# Patient Record
Sex: MALE | Race: OTHER | Employment: UNEMPLOYED | URBAN - METROPOLITAN AREA
[De-identification: names, ages, dates, MRNs, and addresses within clinical notes are randomized per-mention and may not be internally consistent; named-entity substitution may affect disease eponyms.]

---

## 2018-05-02 ENCOUNTER — HOSPITAL ENCOUNTER (OUTPATIENT)
Dept: SLEEP MEDICINE | Age: 33
Discharge: HOME | End: 2018-05-02
Payer: MEDICAID

## 2018-05-02 PROCEDURE — 95806 SLEEP STUDY UNATT&RESP EFFT: CPT

## 2018-05-24 PROBLEM — E78.00 HYPERCHOLESTEROLEMIA: Status: ACTIVE | Noted: 2018-05-24

## 2018-05-24 PROBLEM — F32.A MILD DEPRESSION: Status: RESOLVED | Noted: 2018-05-24 | Resolved: 2018-05-24

## 2018-05-24 PROBLEM — F32.A MILD DEPRESSION: Status: ACTIVE | Noted: 2018-05-24

## 2018-05-24 PROBLEM — F81.9 LEARNING DISABILITY: Status: ACTIVE | Noted: 2018-05-24

## 2018-05-24 PROBLEM — F20.0 PARANOID SCHIZOPHRENIA (HCC): Status: ACTIVE | Noted: 2018-05-24

## 2018-08-08 ENCOUNTER — HOSPITAL ENCOUNTER (OUTPATIENT)
Dept: PHYSICAL THERAPY | Age: 33
Discharge: HOME | End: 2018-08-08
Payer: MEDICAID

## 2018-08-08 PROCEDURE — 97165 OT EVAL LOW COMPLEX 30 MIN: CPT

## 2018-08-08 PROCEDURE — 97110 THERAPEUTIC EXERCISES: CPT

## 2018-08-09 NOTE — PROGRESS NOTES
Ambulatory/Rehab Services H2 Model Falls Risk Assessment    Risk Factor Pts. ·   Confusion/Disorientation/Impulsivity  []    4 ·   Symptomatic Depression  []   2 ·   Altered Elimination  []   1 ·   Dizziness/Vertigo  []   1 ·   Gender (Male)  [x]   1 ·   Any administered antiepileptics (anticonvulsants):  []   2 ·   Any administered benzodiazepines:  []   1 ·   Visual Impairment (specify):  []   1 ·   Portable Oxygen Use  []   1 ·   Orthostatic ? BP  []   1 ·   History of Recent Falls (within 3 mos.)  []   5     Ability to Rise from Chair (choose one) Pts. ·   Ability to rise in a single movement  [x]   0 ·   Pushes up, successful in one attempt  []   1 ·   Multiple attempts, but successful  []   3 ·   Unable to rise without assistance  []   4   Total: (5 or greater = High Risk) 1     Falls Prevention Plan:   []                Physical Limitations to Exercise (specify):   []                Mobility Assistance Device (type):   []                Exercise/Equipment Adaptation (specify):    ©2010 [facility] [address] Patent #[id number].  Federal Law prohibits the replication, distribution or use without written permission from [facility] [employer]

## 2018-08-09 NOTE — THERAPY EVALUATION
Huma Lora  :   Primary: [place] [employer]  Secondary:  [address] at [place] [id number] [facility], [address] [place] [address],[address], [facility] [address].  [phone]   [phone]          OUTPATIENT OCCUPATIONAL THERAPY: Initial Assessment 2018    ICD-10: Treatment Diagnosis: Stiffness of left hand, not elsewhere classified (M25.642)Pain in left hand (K63.055)  Precautions/Allergies:   Review of patient's allergies indicates no known allergies. Fall Risk Score: 1 (? 5 = High Risk)  MD Orders: Evaluate and treat MEDICAL/REFERRING DIAGNOSIS:   Pain in left hand [M79.642]   DATE OF ONSET:    REFERRING PHYSICIAN: Lashodna Romero MD  RETURN PHYSICIAN APPOINTMENT: 6 weeks     INITIAL ASSESSMENT:  Mr. Vasquez Martinez presents with decreased functional use, strength and range of motion of his left hand and upper extremity that is affecting his independence with activities of daily living and ability to perform job tasks. I feel that Mr. Vasquez Martinez will benefit from skilled occupational therapy to maximize the functional use of his hand and upper extremity in daily activities . PLAN OF CARE:   PROBLEM LIST:  1. Pain in left hand. 2. Decreased motion in left hand. 3. Decreased strength in left hand. INTERVENTIONS PLANNED:  1. Modalities that may include fluidotherapy, paraffin, ultrasound, and light therapy. 2. Therapeutic exercise including a home exercise program.  3. Manual therapy. 4. Therapeutic activities. TREATMENT PLAN:  Effective Dates: 2018 TO 2018 (90 days). Frequency/Duration: 2 times a week for 90 Days  GOALS: (Goals have been discussed and agreed upon with patient.)  Short-Term Functional Goals: Time Frame: 4 weeks  1. Decrease pain to 5 to allow patient to perform self care tasks. 2. Increase motion in left hand by 15 degrees to improve functional use of upper extremity in ADL activities.   3. Increase strength in left hand by 10 pounds to allow patient to  and lift objects during self care activities. Discharge Goals: Time Frame: 12 weeks  1. Decrease pain to 2 to allow patient to perform all household  tasks. 2. Increase motion in left hand by 30 degreees to allow patient to perform all ADL activities. 3. Increase strength in left hand by 15 pounds to allow patient to , lift, hold, and carry heavy objects. Rehabilitation Potential For Stated Goals: Katie Mcgee's therapy, I certify that the treatment plan above will be carried out by a therapist or under their direction. Thank you for this referral,  Ángel Perez, OT       Referring Physician Signature: Minerva Rae MD _________________________  Date _________            The information in this section was collected on 8/8/2018 (except where otherwise noted). OCCUPATIONAL PROFILE & HISTORY:   History of Present Injury/Illness (Reason for Referral): The patient fractured his left little finger many years ago and continues to have pain and weakness. Past Medical History/Comorbidities:   Mr. Robert Wilkerson  has a past medical history of Depression. Mr. Robert Wilkerson  has no past surgical history on file. Social History/Living Environment:   Home Environment: Private residence  Prior Level of Function/Work/Activity:  The patient has been disabled for many years. Dominant Side:         RIGHT  Current Medications:    Current Outpatient Prescriptions:     levothyroxine (SYNTHROID) 150 mcg tablet, Take 1 Tab by mouth Daily (before breakfast). , Disp: 30 Tab, Rfl: 6    atorvastatin (LIPITOR) 10 mg tablet, Take 1 Tab by mouth daily. , Disp: 30 Tab, Rfl: 6    hydrOXYzine HCl (ATARAX) 25 mg tablet, Take 25 mg by mouth two (2) times daily as needed for Itching. Indications: anxiety, Disp: , Rfl:     sertraline (ZOLOFT) 25 mg tablet, Take 25 mg by mouth daily. , Disp: , Rfl:     traZODone (DESYREL) 50 mg tablet, Take 50 mg by mouth nightly., Disp: , Rfl: Date Last Reviewed:  8/8/2018   Number of medical conditions (excluding presenting problem) that affect the Plan of Care: Brief history (0):  LOW COMPLEXITY   ASSESSMENT OF OCCUPATIONAL PERFORMANCE:   RANGE OF MOTION:     · AROM: Left little motion is as follows: MP 0/55, PIP 0/95, DIP 0/66 degrees. Tip to [facility] 1 Cm. STRENGTH:   STRENGTH: Right 119 lbs. Left 65 lbs. LAT PINCH : Right 29 lbs. Left 23 lbs. SENSATION:  Intact           Physical Skills Involved:  1. Range of Motion  2. Strength  3. Pain (Chronic) Cognitive Skills Affected (resulting in the inability to perform in a timely and safe manner):  1. Comprehension Psychosocial Skills Affected:  1. Social Interaction   Number of elements that affect the Plan of Care: 5+:  HIGH COMPLEXITY   CLINICAL DECISION MAKING:   Outcome Measure: Tool Used: Disabilities of the Arm, Shoulder and Hand (DASH) Questionnaire - Quick Version  Score:  Initial: 20/55  Most Recent: X/55 (Date: -- )   Interpretation of Score: The DASH is designed to measure the activities of daily living in person's with upper extremity dysfunction or pain. Each section is scored on a 1-5 scale, 5 representing the greatest disability. The scores of each section are added together for a total score of 55. Score 11 12-19 20-28 29-37 38-45 46-54 55   Modifier CH CI CJ CK CL CM CN     ? Carrying, Moving, and Handling Objects:     - CURRENT STATUS: CJ - 20%-39% impaired, limited or restricted    - GOAL STATUS: CI - 1%-19% impaired, limited or restricted    - D/C STATUS:  ---------------To be determined---------------    Medical Necessity:   · Patient is expected to demonstrate progress in strength and range of motion to increase independence with ADL and household activities. .  Reason for Services/Other Comments:  · The patient has limited motion, strength and function in his left hand. .  Clinical Decision-Making Assessment:     Clinical Decision-Making: LOW COMPLEXITY   TREATMENT:   (In addition to Assessment/Re-Assessment sessions the following treatments were rendered)  Pre-treatment Symptoms/Complaints:  Pain and stiffness in left hand. Pain: Initial:   Pain Intensity 1: 4 (increasing to 7 when most intense ( gripping ))  Pain Location 1: Hand  Pain Orientation 1: Left  Post Session:  3     The patient was instructed in a home exercise and strengthening program. ( 30 minutes ) 8/8/2018      Treatment/Session Assessment:    · Response to Treatment:  Patients tolerated treatment well with no complications. Upon completion of treatment, skin condition was normal..  · Compliance with Program/Exercises: Will assess as treatment progresses. · Recommendations/Intent for next treatment session: \"Next visit will focus on advancements to more challenging activities\".   Total Treatment Duration:  OT Patient Time In/Time Out  Time In: 0430  Time Out: [address], OT

## 2018-08-13 ENCOUNTER — HOSPITAL ENCOUNTER (OUTPATIENT)
Dept: PHYSICAL THERAPY | Age: 33
Discharge: HOME | End: 2018-08-13
Payer: MEDICAID

## 2018-08-13 PROCEDURE — 97140 MANUAL THERAPY 1/> REGIONS: CPT

## 2018-08-13 PROCEDURE — 97018 PARAFFIN BATH THERAPY: CPT

## 2018-08-13 PROCEDURE — 97022 WHIRLPOOL THERAPY: CPT

## 2018-08-13 PROCEDURE — 97110 THERAPEUTIC EXERCISES: CPT

## 2018-08-15 NOTE — PROGRESS NOTES
Suhail Martinez  :   Primary: [place] Medicaid  Secondary:  [address] at [place] [address] [facility], [address] [place] [address],[address], [place] [address].  [phone]   [phone]          OUTPATIENT OCCUPATIONAL THERAPY: Daily Note 2018    ICD-10: Treatment Diagnosis: Stiffness of left hand, not elsewhere classified (M25.642)Pain in left hand (R74.493)  Precautions/Allergies:   Review of patient's allergies indicates no known allergies. Fall Risk Score: 1 (? 5 = High Risk)  MD Orders: Evaluate and treat MEDICAL/REFERRING DIAGNOSIS:   Pain in left hand [M79.642]   DATE OF ONSET:    REFERRING PHYSICIAN: Namrata Yee MD  RETURN PHYSICIAN APPOINTMENT: 6 weeks     INITIAL ASSESSMENT:  Mr. Anjelica Pagan presents with decreased functional use, strength and range of motion of his left hand and upper extremity that is affecting his independence with activities of daily living and ability to perform job tasks. I feel that Mr. Anjelica Pagan will benefit from skilled occupational therapy to maximize the functional use of his hand and upper extremity in daily activities . PLAN OF CARE:   PROBLEM LIST:  1. Pain in left hand. 2. Decreased motion in left hand. 3. Decreased strength in left hand. INTERVENTIONS PLANNED:  1. Modalities that may include fluidotherapy, paraffin, ultrasound, and light therapy. 2. Therapeutic exercise including a home exercise program.  3. Manual therapy. 4. Therapeutic activities. TREATMENT PLAN:  Effective Dates: 2018 TO 2018 (90 days). Frequency/Duration: 2 times a week for 90 Days  GOALS: (Goals have been discussed and agreed upon with patient.)  Short-Term Functional Goals: Time Frame: 4 weeks  1. Decrease pain to 5 to allow patient to perform self care tasks. 2. Increase motion in left hand by 15 degrees to improve functional use of upper extremity in ADL activities.   3. Increase strength in left hand by 10 pounds to allow patient to  and lift objects during self care activities. Discharge Goals: Time Frame: 12 weeks  1. Decrease pain to 2 to allow patient to perform all household  tasks. 2. Increase motion in left hand by 30 degreees to allow patient to perform all ADL activities. 3. Increase strength in left hand by 15 pounds to allow patient to , lift, hold, and carry heavy objects. Rehabilitation Potential For Stated Goals: Katie Mcgee's therapy, I certify that the treatment plan above will be carried out by a therapist or under their direction. Thank you for this referral,  Ron Velásquez, OT       Referring Physician Signature: Kenton Steiner MD _________________________  Date _________            The information in this section was collected on 8/8/2018 (except where otherwise noted). OCCUPATIONAL PROFILE & HISTORY:   History of Present Injury/Illness (Reason for Referral): The patient fractured his left little finger many years ago and continues to have pain and weakness. Past Medical History/Comorbidities:   Mr. Larissa Becerra  has a past medical history of Depression. Mr. Larissa Becerra  has no past surgical history on file. Social History/Living Environment:      Prior Level of Function/Work/Activity:  The patient has been disabled for many years. Dominant Side:         RIGHT  Current Medications:    Current Outpatient Prescriptions:     levothyroxine (SYNTHROID) 150 mcg tablet, Take 1 Tab by mouth Daily (before breakfast). , Disp: 30 Tab, Rfl: 6    atorvastatin (LIPITOR) 10 mg tablet, Take 1 Tab by mouth daily. , Disp: 30 Tab, Rfl: 6    hydrOXYzine HCl (ATARAX) 25 mg tablet, Take 25 mg by mouth two (2) times daily as needed for Itching. Indications: anxiety, Disp: , Rfl:     sertraline (ZOLOFT) 25 mg tablet, Take 25 mg by mouth daily. , Disp: , Rfl:     traZODone (DESYREL) 50 mg tablet, Take 50 mg by mouth nightly., Disp: , Rfl:    Date Last Reviewed:  8/8/2018   Number of medical conditions (excluding presenting problem) that affect the Plan of Care: Brief history (0):  LOW COMPLEXITY   ASSESSMENT OF OCCUPATIONAL PERFORMANCE:   RANGE OF MOTION:     · AROM: Left little motion is as follows: MP 0/55, PIP 0/95, DIP 0/66 degrees. Tip to [facility] 1 Cm. STRENGTH:   STRENGTH: Right 119 lbs. Left 65 lbs. LAT PINCH : Right 29 lbs. Left 23 lbs. SENSATION:  Intact           Physical Skills Involved:  1. Range of Motion  2. Strength  3. Pain (Chronic) Cognitive Skills Affected (resulting in the inability to perform in a timely and safe manner):  1. Comprehension Psychosocial Skills Affected:  1. Social Interaction   Number of elements that affect the Plan of Care: 5+:  HIGH COMPLEXITY   CLINICAL DECISION MAKING:   Outcome Measure: Tool Used: Disabilities of the Arm, Shoulder and Hand (DASH) Questionnaire - Quick Version  Score:  Initial: 20/55  Most Recent: X/55 (Date: -- )   Interpretation of Score: The DASH is designed to measure the activities of daily living in person's with upper extremity dysfunction or pain. Each section is scored on a 1-5 scale, 5 representing the greatest disability. The scores of each section are added together for a total score of 55. Score 11 12-19 20-28 29-37 38-45 46-54 55   Modifier CH CI CJ CK CL CM CN     ? Carrying, Moving, and Handling Objects:     - CURRENT STATUS: CJ - 20%-39% impaired, limited or restricted    - GOAL STATUS: CI - 1%-19% impaired, limited or restricted    - D/C STATUS:  ---------------To be determined---------------    Medical Necessity:   · Patient is expected to demonstrate progress in strength and range of motion to increase independence with ADL and household activities. .  Reason for Services/Other Comments:  · The patient has limited motion, strength and function in his left hand. .  Clinical Decision-Making Assessment:     Clinical Decision-Making: LOW COMPLEXITY   TREATMENT:   (In addition to Assessment/Re-Assessment sessions the following treatments were rendered)  Pre-treatment Symptoms/Complaints:  Pain and stiffness in left hand. Pain: Initial:   Pain Intensity 1: 5  Pain Location 1: Hand  Pain Orientation 1: Left  Post Session:  3     The patient was instructed in a home exercise and strengthening program. ( 30 minutes ) 8/8/2018  Patient stated \"I am doing my exercises\"    Manual Therapy: (Soft Tissue Mobilization Duration  Duration: 15 Minutes  Duration: 15 Minutes): Technique: Retrograde massage (followed by light tx & PROM)  Tissue Mobilized: Scar/adhesion  LUE Soft Tissue Mobilization: Yes   Therapeutic Exercise:                                                                               : The patient's home exercise program was reviewed. Date:  8/13/18 Date: Date: Date: Date:   Activity/Exercise Parameters Parameters Parameters Parameters Parameters   AROM during Fluidotherapy 20 min       Paraffin with Stretch   15 min  Finger flexion       Retrograde massage, Friction Scar massage, Joint Mobilization   15 min  Light tx & PROM       Scarf Curl   3 min       Washer Game        Individual Gripper 20 reps         Hand Bard   20 reps       Cones          Pegs          Clothes Pins 20 reps         A-R Bar          Exerstick 40 reps         Velcro-Roll                  RESISTIVE EXERCISES Weight/ Sets/Reps   Weight/ Sets/Reps Weight/ Sets/Reps Weight/ Sets/Reps Weight/ Sets/Reps   WEIGHT WELL        Sup/Pro        UD/RD        Wrist Flex/Ext        Free Weights          UBE(Minutes)          Nautilus        Compound Row        Vertical Chest        Overhead Press                    HEP: As above; handouts given to patient for all exercises.     Therapeutic Modalities:         Left Wrist Heat  Type: Paraffin bath (while perfoming AROM ex)  Duration: 15 minutes  Patient Position: Sitting                                     Joint Mobilization:        Treatment Times:  · Therapeutic Exercise: 30 minutes  · Manual Therapy: 15 minutes  · Parafin: 15 minutes  · Whirlpool:  minutes  · Other:  minutes       Treatment/Session Assessment:    · Response to Treatment:  Patients tolerated treatment well with no complications. Upon completion of treatment, skin condition was normal..  · Compliance with Program/Exercises: Will assess as treatment progresses. · Recommendations/Intent for next treatment session: \"Next visit will focus on advancements to more challenging activities\". Will continue per MD  Total Treatment Duration:  OT Patient Time In/Time Out  Time In: 0400  Time Out: 0500    Cameron Adler OT

## 2018-08-16 ENCOUNTER — HOSPITAL ENCOUNTER (OUTPATIENT)
Dept: PHYSICAL THERAPY | Age: 33
Discharge: HOME | End: 2018-08-16
Payer: MEDICAID

## 2018-08-16 PROCEDURE — 97140 MANUAL THERAPY 1/> REGIONS: CPT

## 2018-08-16 PROCEDURE — 97018 PARAFFIN BATH THERAPY: CPT

## 2018-08-16 PROCEDURE — 97110 THERAPEUTIC EXERCISES: CPT

## 2018-08-18 NOTE — PROGRESS NOTES
Salazar Munoz  :   Primary: [employer]i Chol Medicaid  Secondary:  [address] at [facility]  [address], [address] [address], [facility] [address].  [phone]   [phone]          OUTPATIENT OCCUPATIONAL THERAPY: Daily Note 2018    ICD-10: Treatment Diagnosis: Stiffness of left hand, not elsewhere classified (M25.642)Pain in left hand (L44.742)  Precautions/Allergies:   Review of patient's allergies indicates no known allergies. Fall Risk Score: 1 (? 5 = High Risk)  MD Orders: Evaluate and treat MEDICAL/REFERRING DIAGNOSIS:   Pain in left hand [M79.642]   DATE OF ONSET:    REFERRING PHYSICIAN: Miguel Ellington MD  RETURN PHYSICIAN APPOINTMENT: 6 weeks     INITIAL ASSESSMENT:  Mr. Sheryl Ardon presents with decreased functional use, strength and range of motion of his left hand and upper extremity that is affecting his independence with activities of daily living and ability to perform job tasks. I feel that Mr. Sheryl Ardon will benefit from skilled occupational therapy to maximize the functional use of his hand and upper extremity in daily activities . PLAN OF CARE:   PROBLEM LIST:  1. Pain in left hand. 2. Decreased motion in left hand. 3. Decreased strength in left hand. INTERVENTIONS PLANNED:  1. Modalities that may include fluidotherapy, paraffin, ultrasound, and light therapy. 2. Therapeutic exercise including a home exercise program.  3. Manual therapy. 4. Therapeutic activities. TREATMENT PLAN:  Effective Dates: 2018 TO 2018 (90 days). Frequency/Duration: 2 times a week for 90 Days  GOALS: (Goals have been discussed and agreed upon with patient.)  Short-Term Functional Goals: Time Frame: 4 weeks  1. Decrease pain to 5 to allow patient to perform self care tasks. 2. Increase motion in left hand by 15 degrees to improve functional use of upper extremity in ADL activities.   3. Increase strength in left hand by 10 pounds to allow patient to  and lift objects during self care activities. Discharge Goals: Time Frame: 12 weeks  1. Decrease pain to 2 to allow patient to perform all household  tasks. 2. Increase motion in left hand by 30 degreees to allow patient to perform all ADL activities. 3. Increase strength in left hand by 15 pounds to allow patient to , lift, hold, and carry heavy objects. Rehabilitation Potential For Stated Goals: Katie Mcgee's therapy, I certify that the treatment plan above will be carried out by a therapist or under their direction. Thank you for this referral,  Mickie Acuña OT       Referring Physician Signature: Mike Ruffin MD _________________________  Date _________            The information in this section was collected on 8/8/2018 (except where otherwise noted). OCCUPATIONAL PROFILE & HISTORY:   History of Present Injury/Illness (Reason for Referral): The patient fractured his left little finger many years ago and continues to have pain and weakness. Past Medical History/Comorbidities:   Mr. Antonia Wolff  has a past medical history of Depression. Mr. Antonia Wolff  has no past surgical history on file. Social History/Living Environment:      Prior Level of Function/Work/Activity:  The patient has been disabled for many years. Dominant Side:         RIGHT  Current Medications:    Current Outpatient Prescriptions:     levothyroxine (SYNTHROID) 150 mcg tablet, Take 1 Tab by mouth Daily (before breakfast). , Disp: 30 Tab, Rfl: 6    atorvastatin (LIPITOR) 10 mg tablet, Take 1 Tab by mouth daily. , Disp: 30 Tab, Rfl: 6    hydrOXYzine HCl (ATARAX) 25 mg tablet, Take 25 mg by mouth two (2) times daily as needed for Itching. Indications: anxiety, Disp: , Rfl:     sertraline (ZOLOFT) 25 mg tablet, Take 25 mg by mouth daily. , Disp: , Rfl:     traZODone (DESYREL) 50 mg tablet, Take 50 mg by mouth nightly., Disp: , Rfl:    Date Last Reviewed:  8/16/2018   Number of medical conditions (excluding presenting problem) that affect the Plan of Care: Brief history (0):  LOW COMPLEXITY   ASSESSMENT OF OCCUPATIONAL PERFORMANCE:   RANGE OF MOTION:     · AROM: Left little motion is as follows: MP 0/55, PIP 0/95, DIP 0/66 degrees. Tip to [facility] 1 Cm. STRENGTH:   STRENGTH: Right 119 lbs. Left 65 lbs. LAT PINCH : Right 29 lbs. Left 23 lbs. SENSATION:  Intact           Physical Skills Involved:  1. Range of Motion  2. Strength  3. Pain (Chronic) Cognitive Skills Affected (resulting in the inability to perform in a timely and safe manner):  1. Comprehension Psychosocial Skills Affected:  1. Social Interaction   Number of elements that affect the Plan of Care: 5+:  HIGH COMPLEXITY   CLINICAL DECISION MAKING:   Outcome Measure: Tool Used: Disabilities of the Arm, Shoulder and Hand (DASH) Questionnaire - Quick Version  Score:  Initial: 20/55  Most Recent: X/55 (Date: -- )   Interpretation of Score: The DASH is designed to measure the activities of daily living in person's with upper extremity dysfunction or pain. Each section is scored on a 1-5 scale, 5 representing the greatest disability. The scores of each section are added together for a total score of 55. Score 11 12-19 20-28 29-37 38-45 46-54 55   Modifier CH CI CJ CK CL CM CN     ? Carrying, Moving, and Handling Objects:     - CURRENT STATUS: CJ - 20%-39% impaired, limited or restricted    - GOAL STATUS: CI - 1%-19% impaired, limited or restricted    - D/C STATUS:  ---------------To be determined---------------    Medical Necessity:   · Patient is expected to demonstrate progress in strength and range of motion to increase independence with ADL and household activities. .  Reason for Services/Other Comments:  · The patient has limited motion, strength and function in his left hand. .  Clinical Decision-Making Assessment:     Clinical Decision-Making: LOW COMPLEXITY   TREATMENT:   (In addition to Assessment/Re-Assessment sessions the following treatments were rendered)  Pre-treatment Symptoms/Complaints:  Pain and stiffness in left hand. Pain: Initial:   Pain Intensity 1: 4  Pain Location 1: Hand  Pain Orientation 1: Left  Post Session:  3     The patient was instructed in a home exercise and strengthening program. ( 30 minutes ) 8/8/2018  Patient stated \"I am doing better\"    Manual Therapy: (Soft Tissue Mobilization Duration  Duration: 15 Minutes  Duration: 15 Minutes): Technique: Retrograde massage (followed by Light tx & PROM)  Tissue Mobilized: Scar/adhesion  LUE Soft Tissue Mobilization: Yes   Therapeutic Exercise:                                                                               : The patient's home exercise program was reviewed. Date:  8/13/18 Date:  8/16/18 Date: Date: Date:   Activity/Exercise Parameters Parameters Parameters Parameters Parameters   AROM during Fluidotherapy 20 min 20 min      Paraffin with Stretch   15 min  Finger flexion 15 min  Finger flexion      Retrograde massage, Friction Scar massage, Joint Mobilization   15 min  Light tx & PROM 15 min  Light tx  PROM      Scarf Curl   3 min 3 min      Washer Game        Individual Gripper 20 reps   25 reps      Hand [place]   20 reps 25 reps      Cones          Pegs          Clothes Pins 20 reps   20 reps      A-R Bar          Exerstick 40 reps   40 reps      Velcro-Roll                  RESISTIVE EXERCISES Weight/ Sets/Reps   Weight/ Sets/Reps Weight/ Sets/Reps Weight/ Sets/Reps Weight/ Sets/Reps   WEIGHT WELL        Sup/Pro        UD/RD        Wrist Flex/Ext        Free Weights          UBE(Minutes)          Nautilus        Compound Row        Vertical Chest        Overhead Press                    HEP: As above; handouts given to patient for all exercises.     Therapeutic Modalities:         Left Wrist Heat  Type: Paraffin bath (with a finger flexion stretch)  Duration: 15 minutes  Patient Position: Sitting                                     Joint Mobilization:        Treatment Times:  · Therapeutic Exercise: 30 minutes  · Manual Therapy: 15 minutes  · Parafin: 15 minutes  · Whirlpool:  minutes  · Other:  minutes       Treatment/Session Assessment:    · Response to Treatment:  Patients tolerated treatment well with no complications. Upon completion of treatment, skin condition was normal..  · Compliance with Program/Exercises: Will assess as treatment progresses. · Recommendations/Intent for next treatment session: \"Next visit will focus on advancements to more challenging activities\". Will continue per MD  Total Treatment Duration:  OT Patient Time In/Time Out  Time In: 0430  Time Out: [address] [facility],

## 2018-08-30 ENCOUNTER — HOSPITAL ENCOUNTER (OUTPATIENT)
Dept: PHYSICAL THERAPY | Age: 33
Discharge: HOME | End: 2018-08-30
Payer: MEDICAID

## 2018-08-30 PROCEDURE — 97140 MANUAL THERAPY 1/> REGIONS: CPT

## 2018-08-30 PROCEDURE — 97018 PARAFFIN BATH THERAPY: CPT

## 2018-08-30 PROCEDURE — 97110 THERAPEUTIC EXERCISES: CPT

## 2018-08-30 NOTE — PROGRESS NOTES
Atul Jackson  :   Primary: Brigid Mock Medicaid  Secondary:  [address] at [place] [address] [facility], [address] [place] [address],[address], [facility] [address].  [phone]   [phone]          OUTPATIENT OCCUPATIONAL THERAPY: Daily Note 2018    ICD-10: Treatment Diagnosis: Stiffness of left hand, not elsewhere classified (M25.642)Pain in left hand (P21.039)  Precautions/Allergies:   Review of patient's allergies indicates no known allergies. Fall Risk Score: 1 (? 5 = High Risk)  MD Orders: Evaluate and treat MEDICAL/REFERRING DIAGNOSIS:   Pain in left hand [M79.642]   DATE OF ONSET:    REFERRING PHYSICIAN: Fatemeh Brown MD  RETURN PHYSICIAN APPOINTMENT: 6 weeks     INITIAL ASSESSMENT:  Mr. Griffin Rosas presents with decreased functional use, strength and range of motion of his left hand and upper extremity that is affecting his independence with activities of daily living and ability to perform job tasks. I feel that Mr. Griffin Rosas will benefit from skilled occupational therapy to maximize the functional use of his hand and upper extremity in daily activities . PLAN OF CARE:   PROBLEM LIST:  1. Pain in left hand. 2. Decreased motion in left hand. 3. Decreased strength in left hand. INTERVENTIONS PLANNED:  1. Modalities that may include fluidotherapy, paraffin, ultrasound, and light therapy. 2. Therapeutic exercise including a home exercise program.  3. Manual therapy. 4. Therapeutic activities. TREATMENT PLAN:  Effective Dates: 2018 TO 2018 (90 days). Frequency/Duration: 2 times a week for 90 Days  GOALS: (Goals have been discussed and agreed upon with patient.)  Short-Term Functional Goals: Time Frame: 4 weeks  1. Decrease pain to 5 to allow patient to perform self care tasks. 2. Increase motion in left hand by 15 degrees to improve functional use of upper extremity in ADL activities.   3. Increase strength in left hand by 10 pounds to allow patient to  and lift objects during self care activities. Discharge Goals: Time Frame: 12 weeks  1. Decrease pain to 2 to allow patient to perform all household  tasks. 2. Increase motion in left hand by 30 degreees to allow patient to perform all ADL activities. 3. Increase strength in left hand by 15 pounds to allow patient to , lift, hold, and carry heavy objects. Rehabilitation Potential For Stated Goals: Katie Mcgee's therapy, I certify that the treatment plan above will be carried out by a therapist or under their direction. Thank you for this referral,  Lizabeth Coreas, AMANDO       Referring Physician Signature: Miguel Ellington MD _________________________  Date _________            The information in this section was collected on 8/8/2018 (except where otherwise noted). OCCUPATIONAL PROFILE & HISTORY:   History of Present Injury/Illness (Reason for Referral): The patient fractured his left little finger many years ago and continues to have pain and weakness. Past Medical History/Comorbidities:   Mr. Sheryl Ardon  has a past medical history of Depression. Mr. Sheryl Ardon  has no past surgical history on file. Social History/Living Environment:      Prior Level of Function/Work/Activity:  The patient has been disabled for many years. Dominant Side:         RIGHT  Current Medications:    Current Outpatient Prescriptions:     levothyroxine (SYNTHROID) 150 mcg tablet, Take 1 Tab by mouth Daily (before breakfast). , Disp: 30 Tab, Rfl: 6    atorvastatin (LIPITOR) 10 mg tablet, Take 1 Tab by mouth daily. , Disp: 30 Tab, Rfl: 6    hydrOXYzine HCl (ATARAX) 25 mg tablet, Take 25 mg by mouth two (2) times daily as needed for Itching. Indications: anxiety, Disp: , Rfl:     sertraline (ZOLOFT) 25 mg tablet, Take 25 mg by mouth daily. , Disp: , Rfl:     traZODone (DESYREL) 50 mg tablet, Take 50 mg by mouth nightly., Disp: , Rfl:    Date Last Reviewed:  8/16/2018   Number of medical conditions (excluding presenting problem) that affect the Plan of Care: Brief history (0):  LOW COMPLEXITY   ASSESSMENT OF OCCUPATIONAL PERFORMANCE:   RANGE OF MOTION:     · AROM: Left little motion is as follows: MP 0/55, PIP 0/95, DIP 0/66 degrees. Tip to [facility] 1 Cm. STRENGTH:   STRENGTH: Right 119 lbs. Left 65 lbs. LAT PINCH : Right 29 lbs. Left 23 lbs. SENSATION:  Intact           Physical Skills Involved:  1. Range of Motion  2. Strength  3. Pain (Chronic) Cognitive Skills Affected (resulting in the inability to perform in a timely and safe manner):  1. Comprehension Psychosocial Skills Affected:  1. Social Interaction   Number of elements that affect the Plan of Care: 5+:  HIGH COMPLEXITY   CLINICAL DECISION MAKING:   Outcome Measure: Tool Used: Disabilities of the Arm, Shoulder and Hand (DASH) Questionnaire - Quick Version  Score:  Initial: 20/55  Most Recent: X/55 (Date: -- )   Interpretation of Score: The DASH is designed to measure the activities of daily living in person's with upper extremity dysfunction or pain. Each section is scored on a 1-5 scale, 5 representing the greatest disability. The scores of each section are added together for a total score of 55. Score 11 12-19 20-28 29-37 38-45 46-54 55   Modifier CH CI CJ CK CL CM CN     ? Carrying, Moving, and Handling Objects:     - CURRENT STATUS: CJ - 20%-39% impaired, limited or restricted    - GOAL STATUS: CI - 1%-19% impaired, limited or restricted    - D/C STATUS:  ---------------To be determined---------------    Medical Necessity:   · Patient is expected to demonstrate progress in strength and range of motion to increase independence with ADL and household activities. .  Reason for Services/Other Comments:  · The patient has limited motion, strength and function in his left hand. .  Clinical Decision-Making Assessment:     Clinical Decision-Making: LOW COMPLEXITY   TREATMENT:   (In addition to Assessment/Re-Assessment sessions the following treatments were rendered)  Pre-treatment Symptoms/Complaints:  Pain and stiffness in left hand. Pain: Initial:   Pain Intensity 1: 0  Pain Location 1: Hand  Pain Orientation 1: Left  Post Session:  0     The patient was instructed in a home exercise and strengthening program. ( 30 minutes ) 8/8/2018  Patient stated \"I am not having any pain right now\"    Manual Therapy: (Soft Tissue Mobilization Duration  Duration: 15 Minutes  Duration: 15 Minutes): Technique: Retrograde massage (followed by Light tx & PROM)  Tissue Mobilized: Scar/adhesion  LUE Soft Tissue Mobilization: Yes   Therapeutic Exercise:                                                                               : The patient's home exercise program was reviewed. Date:  8/13/18 Date:  8/16/18 Date:  8/30/18 Date: Date:   Activity/Exercise Parameters Parameters Parameters Parameters Parameters   AROM during Fluidotherapy 20 min 20 min 20 min     Paraffin with Stretch   15 min  Finger flexion 15 min  Finger flexion 15 min  flexion     Retrograde massage, Friction Scar massage, Joint Mobilization   15 min  Light tx & PROM 15 min  Light tx  PROM 15 min  Light tx     Scarf Curl   3 min 3 min 3 min     Washer Game        Individual Gripper 20 reps   25 reps 25 reps     Hand Surveyor   20 reps 25 reps 25 reps     Cones          Pegs          Clothes Pins 20 reps   20 reps 20 reps     A-R Bar          Exerstick 40 reps   40 reps 40 reps     Velcro-Roll                  RESISTIVE EXERCISES Weight/ Sets/Reps   Weight/ Sets/Reps Weight/ Sets/Reps Weight/ Sets/Reps Weight/ Sets/Reps   WEIGHT WELL        Sup/Pro        UD/RD        Wrist Flex/Ext        Free Weights          UBE(Minutes)          Nautilus        Compound Row        Vertical Chest        Overhead Press                    HEP: As above; handouts given to patient for all exercises.     Therapeutic Modalities:         Left Wrist Heat  Type: Paraffin bath (with a finger flexion stretch)  Duration: 15 minutes  Patient Position: Sitting                                     Joint Mobilization:        Treatment Times:  · Therapeutic Exercise: 30 minutes  · Manual Therapy: 15 minutes  · Parafin: 15 minutes  · Whirlpool:  minutes  · Other:  minutes       Treatment/Session Assessment:    · Response to Treatment:  Patients tolerated treatment well with no complications. Upon completion of treatment, skin condition was normal..  · Compliance with Program/Exercises: Will assess as treatment progresses. · Recommendations/Intent for next treatment session: \"Next visit will focus on advancements to more challenging activities\". Will continue per MD  Total Treatment Duration:  OT Patient Time In/Time Out  Time In: 4277  Time Out: [address], OT

## 2018-09-06 ENCOUNTER — HOSPITAL ENCOUNTER (OUTPATIENT)
Dept: PHYSICAL THERAPY | Age: 33
Discharge: HOME | End: 2018-09-06
Payer: MEDICAID

## 2018-09-06 PROCEDURE — 97110 THERAPEUTIC EXERCISES: CPT

## 2018-09-06 PROCEDURE — 97140 MANUAL THERAPY 1/> REGIONS: CPT

## 2018-09-06 PROCEDURE — 97018 PARAFFIN BATH THERAPY: CPT

## 2018-09-10 NOTE — PROGRESS NOTES
Tequila Gonzalez  :   Primary: [employer] Medicaid  Secondary:  [address] at [place] [address] [facility], [address] [place] [address],[address], [address] [facility]  [phone]   [phone]          OUTPATIENT OCCUPATIONAL THERAPY: Daily Note 2018    ICD-10: Treatment Diagnosis: Stiffness of left hand, not elsewhere classified (M25.642)Pain in left hand (R36.411)  Precautions/Allergies:   Review of patient's allergies indicates no known allergies. Fall Risk Score: 1 (? 5 = High Risk)  MD Orders: Evaluate and treat MEDICAL/REFERRING DIAGNOSIS:   Pain in left hand [M79.642]   DATE OF ONSET:    REFERRING PHYSICIAN: Mariam Nicole MD  RETURN PHYSICIAN APPOINTMENT: 6 weeks     INITIAL ASSESSMENT:  Mr. Lucendia Frankel presents with decreased functional use, strength and range of motion of his left hand and upper extremity that is affecting his independence with activities of daily living and ability to perform job tasks. I feel that Mr. Lucendia Frankel will benefit from skilled occupational therapy to maximize the functional use of his hand and upper extremity in daily activities . PLAN OF CARE:   PROBLEM LIST:  1. Pain in left hand. 2. Decreased motion in left hand. 3. Decreased strength in left hand. INTERVENTIONS PLANNED:  1. Modalities that may include fluidotherapy, paraffin, ultrasound, and light therapy. 2. Therapeutic exercise including a home exercise program.  3. Manual therapy. 4. Therapeutic activities. TREATMENT PLAN:  Effective Dates: 2018 TO 2018 (90 days). Frequency/Duration: 2 times a week for 90 Days  GOALS: (Goals have been discussed and agreed upon with patient.)  Short-Term Functional Goals: Time Frame: 4 weeks  1. Decrease pain to 5 to allow patient to perform self care tasks. 2. Increase motion in left hand by 15 degrees to improve functional use of upper extremity in ADL activities.   3. Increase strength in left hand by 10 pounds to allow patient to  and lift objects during self care activities. Discharge Goals: Time Frame: 12 weeks  1. Decrease pain to 2 to allow patient to perform all household  tasks. 2. Increase motion in left hand by 30 degreees to allow patient to perform all ADL activities. 3. Increase strength in left hand by 15 pounds to allow patient to , lift, hold, and carry heavy objects. Rehabilitation Potential For Stated Goals: Katie Mcgee's therapy, I certify that the treatment plan above will be carried out by a therapist or under their direction. Thank you for this referral,  Meghna Villavicencio, OT       Referring Physician Signature: Sruthi Ross MD _________________________  Date _________            The information in this section was collected on 8/8/2018 (except where otherwise noted). OCCUPATIONAL PROFILE & HISTORY:   History of Present Injury/Illness (Reason for Referral): The patient fractured his left little finger many years ago and continues to have pain and weakness. Past Medical History/Comorbidities:   Mr. John Hernandez  has a past medical history of Depression. Mr. John Hernandez  has no past surgical history on file. Social History/Living Environment:      Prior Level of Function/Work/Activity:  The patient has been disabled for many years. Dominant Side:         RIGHT  Current Medications:    Current Outpatient Prescriptions:     levothyroxine (SYNTHROID) 150 mcg tablet, Take 1 Tab by mouth Daily (before breakfast). , Disp: 30 Tab, Rfl: 6    atorvastatin (LIPITOR) 10 mg tablet, Take 1 Tab by mouth daily. , Disp: 30 Tab, Rfl: 6    hydrOXYzine HCl (ATARAX) 25 mg tablet, Take 25 mg by mouth two (2) times daily as needed for Itching. Indications: anxiety, Disp: , Rfl:     sertraline (ZOLOFT) 25 mg tablet, Take 25 mg by mouth daily. , Disp: , Rfl:     traZODone (DESYREL) 50 mg tablet, Take 50 mg by mouth nightly., Disp: , Rfl:    Date Last Reviewed:  9/6/2018   Number of medical conditions (excluding presenting problem) that affect the Plan of Care: Brief history (0):  LOW COMPLEXITY   ASSESSMENT OF OCCUPATIONAL PERFORMANCE:   RANGE OF MOTION:     · AROM: Left little motion is as follows: MP 0/55, PIP 0/95, DIP 0/66 degrees. Tip to [facility] 1 Cm. STRENGTH:   STRENGTH: Right 119 lbs. Left 65 lbs. LAT PINCH : Right 29 lbs. Left 23 lbs. SENSATION:  Intact           Physical Skills Involved:  1. Range of Motion  2. Strength  3. Pain (Chronic) Cognitive Skills Affected (resulting in the inability to perform in a timely and safe manner):  1. Comprehension Psychosocial Skills Affected:  1. Social Interaction   Number of elements that affect the Plan of Care: 5+:  HIGH COMPLEXITY   CLINICAL DECISION MAKING:   Outcome Measure: Tool Used: Disabilities of the Arm, Shoulder and Hand (DASH) Questionnaire - Quick Version  Score:  Initial: 20/55  Most Recent: X/55 (Date: -- )   Interpretation of Score: The DASH is designed to measure the activities of daily living in person's with upper extremity dysfunction or pain. Each section is scored on a 1-5 scale, 5 representing the greatest disability. The scores of each section are added together for a total score of 55. Score 11 12-19 20-28 29-37 38-45 46-54 55   Modifier CH CI CJ CK CL CM CN     ? Carrying, Moving, and Handling Objects:     - CURRENT STATUS: CJ - 20%-39% impaired, limited or restricted    - GOAL STATUS: CI - 1%-19% impaired, limited or restricted    - D/C STATUS:  ---------------To be determined---------------    Medical Necessity:   · Patient is expected to demonstrate progress in strength and range of motion to increase independence with ADL and household activities. .  Reason for Services/Other Comments:  · The patient has limited motion, strength and function in his left hand. .  Clinical Decision-Making Assessment:     Clinical Decision-Making: LOW COMPLEXITY   TREATMENT:   (In addition to Assessment/Re-Assessment sessions the following treatments were rendered)  Pre-treatment Symptoms/Complaints:  Pain and stiffness in left hand. Pain: Initial:   Pain Intensity 1: 0  Pain Location 1: Hand  Pain Orientation 1: Left  Post Session:  0     The patient was instructed in a home exercise and strengthening program. ( 30 minutes ) 8/8/2018  Patient stated \"I am not having any pain right now\"    Manual Therapy: (Soft Tissue Mobilization Duration  Duration: 15 Minutes  Duration: 15 Minutes): Technique: Retrograde massage (followed by Light tx & PROM)  Tissue Mobilized: Scar/adhesion  LUE Soft Tissue Mobilization: Yes   Therapeutic Exercise:                                                                               : The patient's home exercise program was reviewed.                                                 Date:  8/13/18 Date:  8/16/18 Date:  8/30/18 Date:  9/6/18 Date:   Activity/Exercise Parameters Parameters Parameters Parameters Parameters   AROM during Fluidotherapy 20 min 20 min 20 min 20 min    Paraffin with Stretch   15 min  Finger flexion 15 min  Finger flexion 15 min  flexion 15 min    Retrograde massage, Friction Scar massage, Joint Mobilization   15 min  Light tx & PROM 15 min  Light tx  PROM 15 min  Light tx 15 min  Light tx    Scarf Curl   3 min 3 min 3 min 3 min    [place]her Game        Individual Gripper 20 reps   25 reps 25 reps 25 reps    Hand [place]   20 reps 25 reps 25 reps 25 reps    [place]es          Pegs          Clothes Pins 20 reps   20 reps 20 reps 25 reps    A-R Bar          Exerstick 40 reps   40 reps 40 reps 40 reps    Velcro-Roll                  RESISTIVE EXERCISES Weight/ Sets/Reps   Weight/ Sets/Reps Weight/ Sets/Reps Weight/ Sets/Reps Weight/ Sets/Reps   WEIGHT WELL        Sup/Pro        UD/RD        Wrist Flex/Ext        Free Weights          UBE(Minutes)          Nautilus        Compound Row        Vertical Chest        Overhead Press                    HEP: As above; handouts given to patient for all exercises. Therapeutic Modalities:         Left Wrist Heat  Type: Paraffin bath (with a finger flexion stretch)  Duration: 15 minutes  Patient Position: Sitting                                     Joint Mobilization:        Treatment Times:  · Therapeutic Exercise: 30 minutes  · Manual Therapy: 15 minutes  · Parafin: 15 minutes  · Whirlpool:  minutes  · Other:  minutes       Treatment/Session Assessment:    · Response to Treatment:  Patients tolerated treatment well with no complications. Upon completion of treatment, skin condition was normal..  · Compliance with Program/Exercises: Will assess as treatment progresses. · Recommendations/Intent for next treatment session: \"Next visit will focus on advancements to more challenging activities\". Will continue per MD  Total Treatment Duration:  OT Patient Time In/Time Out  Time In: 0500  Time Out: 1560 [facility],

## 2019-03-13 NOTE — THERAPY DISCHARGE
Lisbet Davidson  :   Primary: Denise Terrazash Medicaid  Secondary:  [facility] [place] [id number] [facility], [address] [place] [address],[address], [address] [facility]  [phone]   [phone]          OUTPATIENT OCCUPATIONAL THERAPY: Discontinuation Summary     ICD-10: Treatment Diagnosis: Stiffness of left hand, not elsewhere classified (M25.642)Pain in left hand (S38.420)  Precautions/Allergies:   Patient has no known allergies. Fall Risk Score: 1 (? 5 = High Risk)  MD Orders: Evaluate and treat MEDICAL/REFERRING DIAGNOSIS:   Pain in left hand [M79.642]   DATE OF ONSET:    REFERRING PHYSICIAN: Placido Rhoades MD  RETURN PHYSICIAN APPOINTMENT: 6 weeks     INITIAL ASSESSMENT:  Mr. Eufemia Garner presents with decreased functional use, strength and range of motion of his left hand and upper extremity that is affecting his independence with activities of daily living and ability to perform job tasks. I feel that Mr. Eufemia Garner will benefit from skilled occupational therapy to maximize the functional use of his hand and upper extremity in daily activities . PLAN OF CARE:   PROBLEM LIST:  1. Pain in left hand. 2. Decreased motion in left hand. 3. Decreased strength in left hand. INTERVENTIONS PLANNED:  1. Modalities that may include fluidotherapy, paraffin, ultrasound, and light therapy. 2. Therapeutic exercise including a home exercise program.  3. Manual therapy. 4. Therapeutic activities. TREATMENT PLAN:  Effective Dates: 2018 TO 2018 (90 days). Frequency/Duration: 2 times a week for 90 Days  GOALS: (Goals have been discussed and agreed upon with patient.)  Short-Term Functional Goals: Time Frame: 4 weeks  1. Decrease pain to 5 to allow patient to perform self care tasks. 2. Increase motion in left hand by 15 degrees to improve functional use of upper extremity in ADL activities.   3. Increase strength in left hand by 10 pounds to allow patient to  and lift objects during self care activities. Discharge Goals: Time Frame: 12 weeks  1. Decrease pain to 2 to allow patient to perform all household  tasks. 2. Increase motion in left hand by 30 degreees to allow patient to perform all ADL activities. 3. Increase strength in left hand by 15 pounds to allow patient to , lift, hold, and carry heavy objects. Rehabilitation Potential For Stated Goals: Katie Mcgee's therapy, I certify that the treatment plan above will be carried out by a therapist or under their direction. Thank you for this referral,  Cameron Adler, AMANDO       Referring Physician Signature: Namrata Yee MD _________________________  Date _________            The information in this section was collected on 8/8/2018 (except where otherwise noted). OCCUPATIONAL PROFILE & HISTORY:   History of Present Injury/Illness (Reason for Referral): The patient fractured his left little finger many years ago and continues to have pain and weakness. Past Medical History/Comorbidities:   Mr. Anjelica Pagan  has a past medical history of ALT (SGPT) level raised, Depression, and Thyroid disease. Mr. Anjelica Pagan  has no past surgical history on file. Social History/Living Environment:      Prior Level of Function/Work/Activity:  The patient has been disabled for many years. Dominant Side:         RIGHT  Current Medications:    Current Outpatient Medications:     levothyroxine (SYNTHROID) 150 mcg tablet, Take 1 Tab by mouth Daily (before breakfast). , Disp: 90 Tab, Rfl: 3    atorvastatin (LIPITOR) 10 mg tablet, Take 1 Tab by mouth daily. , Disp: 90 Tab, Rfl: 3    hydrOXYzine HCl (ATARAX) 25 mg tablet, Take 25 mg by mouth two (2) times daily as needed for Itching. Indications: anxiety, Disp: , Rfl:     sertraline (ZOLOFT) 25 mg tablet, Take 25 mg by mouth daily. , Disp: , Rfl:     traZODone (DESYREL) 50 mg tablet, Take 50 mg by mouth nightly., Disp: , Rfl:    Date Last Reviewed: 9/6/2018   Number of medical conditions (excluding presenting problem) that affect the Plan of Care: Brief history (0):  LOW COMPLEXITY   ASSESSMENT OF OCCUPATIONAL PERFORMANCE:   RANGE OF MOTION:     · AROM: Left little motion is as follows: MP 0/55, PIP 0/95, DIP 0/66 degrees. Tip to [facility] 1 Cm. STRENGTH:   STRENGTH: Right 119 lbs. Left 65 lbs. LAT PINCH : Right 29 lbs. Left 23 lbs. SENSATION:  Intact           Physical Skills Involved:  1. Range of Motion  2. Strength  3. Pain (Chronic) Cognitive Skills Affected (resulting in the inability to perform in a timely and safe manner):  1. Comprehension Psychosocial Skills Affected:  1. Social Interaction   Number of elements that affect the Plan of Care: 5+:  HIGH COMPLEXITY   CLINICAL DECISION MAKING:   Outcome Measure: Tool Used: Disabilities of the Arm, Shoulder and Hand (DASH) Questionnaire - Quick Version  Score:  Initial: 20/55  Most Recent: X/55 (Date: -- )   Interpretation of Score: The DASH is designed to measure the activities of daily living in person's with upper extremity dysfunction or pain. Each section is scored on a 1-5 scale, 5 representing the greatest disability. The scores of each section are added together for a total score of 55. Score 11 12-19 20-28 29-37 38-45 46-54 55   Modifier CH CI CJ CK CL CM CN     ? Carrying, Moving, and Handling Objects:     - CURRENT STATUS: CJ - 20%-39% impaired, limited or restricted    - GOAL STATUS: CI - 1%-19% impaired, limited or restricted    - D/C STATUS:  ---------------To be determined---------------    Medical Necessity:   · Patient is expected to demonstrate progress in strength and range of motion to increase independence with ADL and household activities. .  Reason for Services/Other Comments:  · The patient has limited motion, strength and function in his left hand. .  Clinical Decision-Making Assessment:     Clinical Decision-Making: LOW COMPLEXITY TREATMENT:   (In addition to Assessment/Re-Assessment sessions the following treatments were rendered)  Pre-treatment Symptoms/Complaints:  Pain and stiffness in left hand. Pain: Initial:   Pain Intensity 1: 0  Pain Location 1: Hand  Pain Orientation 1: Left  Post Session:  0       Treatment/Session Assessment:    · Response to Treatment:  Patients tolerated treatment well with no complications. Upon completion of treatment, skin condition was normal..  · Compliance with Program/Exercises: Will assess as treatment progresses. · Recommendations/Intent for next treatment session: \"To discharge, patient did not return to therapy. \"        Elyssa Ricardo, OT

## 2022-03-18 PROBLEM — F20.0 PARANOID SCHIZOPHRENIA (HCC): Status: ACTIVE | Noted: 2018-05-24

## 2022-03-19 PROBLEM — E78.00 HYPERCHOLESTEROLEMIA: Status: ACTIVE | Noted: 2018-05-24

## 2022-03-19 PROBLEM — F81.9 LEARNING DISABILITY: Status: ACTIVE | Noted: 2018-05-24

## 2023-09-28 ENCOUNTER — TRANSCRIBE ORDERS (OUTPATIENT)
Dept: GENERAL RADIOLOGY | Age: 38
End: 2023-09-28

## 2023-09-28 ENCOUNTER — HOSPITAL ENCOUNTER (OUTPATIENT)
Dept: NON INVASIVE DIAGNOSTICS | Age: 38
Discharge: HOME | End: 2023-09-28
Payer: COMMERCIAL

## 2023-09-28 ENCOUNTER — HOSPITAL ENCOUNTER (OUTPATIENT)
Dept: GENERAL RADIOLOGY | Age: 38
Discharge: HOME | End: 2023-09-30
Payer: COMMERCIAL

## 2023-09-28 DIAGNOSIS — M54.6 THORACIC BACK PAIN, UNSPECIFIED BACK PAIN LATERALITY, UNSPECIFIED CHRONICITY: Primary | ICD-10-CM

## 2023-09-28 DIAGNOSIS — M54.50 LOW BACK PAIN, UNSPECIFIED BACK PAIN LATERALITY, UNSPECIFIED CHRONICITY, UNSPECIFIED WHETHER SCIATICA PRESENT: ICD-10-CM

## 2023-09-28 DIAGNOSIS — M54.6 THORACIC BACK PAIN, UNSPECIFIED BACK PAIN LATERALITY, UNSPECIFIED CHRONICITY: ICD-10-CM

## 2023-09-28 PROCEDURE — 72072 X-RAY EXAM THORAC SPINE 3VWS: CPT

## 2023-09-28 PROCEDURE — 72110 X-RAY EXAM L-2 SPINE 4/>VWS: CPT

## 2023-09-28 PROCEDURE — 93005 ELECTROCARDIOGRAM TRACING: CPT | Performed by: NURSE PRACTITIONER

## 2023-09-29 LAB
EKG ATRIAL RATE: 45 BPM
EKG P AXIS: 21 DEGREES
EKG P-R INTERVAL: 150 MS
EKG Q-T INTERVAL: 424 MS
EKG QRS DURATION: 90 MS
EKG QTC CALCULATION (BAZETT): 366 MS
EKG R AXIS: 29 DEGREES
EKG T AXIS: 49 DEGREES
EKG VENTRICULAR RATE: 45 BPM

## 2024-09-10 ENCOUNTER — APPOINTMENT (OUTPATIENT)
Dept: GENERAL RADIOLOGY | Age: 39
End: 2024-09-10
Payer: COMMERCIAL

## 2024-09-10 ENCOUNTER — HOSPITAL ENCOUNTER (EMERGENCY)
Age: 39
Discharge: HOME | End: 2024-09-10
Payer: COMMERCIAL

## 2024-09-10 VITALS
OXYGEN SATURATION: 98 % | TEMPERATURE: 98.7 F | RESPIRATION RATE: 17 BRPM | DIASTOLIC BLOOD PRESSURE: 67 MMHG | SYSTOLIC BLOOD PRESSURE: 142 MMHG | WEIGHT: 173 LBS | HEART RATE: 60 BPM | BODY MASS INDEX: 24.22 KG/M2 | HEIGHT: 71 IN
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
## 2024-09-10 PROCEDURE — 6370000000 HC RX 637 (ALT 250 FOR IP)

## 2024-09-10 PROCEDURE — 90715 TDAP VACCINE 7 YRS/> IM: CPT

## 2024-09-10 PROCEDURE — 99284 EMERGENCY DEPT VISIT MOD MDM: CPT

## 2024-09-10 PROCEDURE — 71100 X-RAY EXAM RIBS UNI 2 VIEWS: CPT

## 2024-09-10 PROCEDURE — 6360000002 HC RX W HCPCS

## 2024-09-10 PROCEDURE — 90471 IMMUNIZATION ADMIN: CPT

## 2024-09-10 RX ORDER — BACITRACIN ZINC 500 [USP'U]/G
OINTMENT TOPICAL ONCE
Status: COMPLETED | OUTPATIENT
Start: 2024-09-10 | End: 2024-09-10

## 2024-09-10 RX ADMIN — TETANUS TOXOID, REDUCED DIPHTHERIA TOXOID AND ACELLULAR PERTUSSIS VACCINE, ADSORBED 0.5 ML: 5; 2.5; 8; 8; 2.5 SUSPENSION INTRAMUSCULAR at 16:51

## 2024-09-10 RX ADMIN — BACITRACIN ZINC: 500 OINTMENT TOPICAL at 16:43

## 2024-09-10 ASSESSMENT — PAIN - FUNCTIONAL ASSESSMENT
PAIN_FUNCTIONAL_ASSESSMENT: NONE - DENIES PAIN
PAIN_FUNCTIONAL_ASSESSMENT: 0-10

## 2024-09-10 ASSESSMENT — PAIN DESCRIPTION - LOCATION: LOCATION: ARM;RIB CAGE

## 2024-09-10 ASSESSMENT — LIFESTYLE VARIABLES
HOW MANY STANDARD DRINKS CONTAINING ALCOHOL DO YOU HAVE ON A TYPICAL DAY: 3 OR 4
HOW OFTEN DO YOU HAVE A DRINK CONTAINING ALCOHOL: 2-3 TIMES A WEEK

## 2024-09-10 ASSESSMENT — PAIN DESCRIPTION - DESCRIPTORS: DESCRIPTORS: ACHING

## 2024-09-10 ASSESSMENT — PAIN DESCRIPTION - FREQUENCY: FREQUENCY: CONTINUOUS

## 2024-09-10 ASSESSMENT — PAIN DESCRIPTION - PAIN TYPE: TYPE: ACUTE PAIN

## 2024-09-10 ASSESSMENT — PAIN DESCRIPTION - ORIENTATION: ORIENTATION: LEFT

## 2024-09-10 ASSESSMENT — PAIN SCALES - GENERAL: PAINLEVEL_OUTOF10: 5

## 2024-09-11 ASSESSMENT — ENCOUNTER SYMPTOMS
SHORTNESS OF BREATH: 0
TROUBLE SWALLOWING: 0
VOICE CHANGE: 0
COLOR CHANGE: 1
NAUSEA: 0
VOMITING: 0